# Patient Record
Sex: FEMALE | Race: WHITE | Employment: OTHER | URBAN - METROPOLITAN AREA
[De-identification: names, ages, dates, MRNs, and addresses within clinical notes are randomized per-mention and may not be internally consistent; named-entity substitution may affect disease eponyms.]

---

## 2018-02-05 ENCOUNTER — THERAPY VISIT (OUTPATIENT)
Dept: PHYSICAL THERAPY | Facility: CLINIC | Age: 63
End: 2018-02-05
Payer: COMMERCIAL

## 2018-02-05 DIAGNOSIS — M75.41 IMPINGEMENT SYNDROME OF SHOULDER REGION, RIGHT: Primary | ICD-10-CM

## 2018-02-05 PROCEDURE — 97112 NEUROMUSCULAR REEDUCATION: CPT | Mod: GP | Performed by: PHYSICAL THERAPIST

## 2018-02-05 PROCEDURE — 97110 THERAPEUTIC EXERCISES: CPT | Mod: GP | Performed by: PHYSICAL THERAPIST

## 2018-02-05 PROCEDURE — 97161 PT EVAL LOW COMPLEX 20 MIN: CPT | Mod: GP | Performed by: PHYSICAL THERAPIST

## 2018-02-05 NOTE — MR AVS SNAPSHOT
After Visit Summary   2/5/2018    Radha Schneider    MRN: [id number]           Patient Information     Date Of Birth          1955        Visit Information        Provider Department      2/5/2018 7:00 AM Marifer Andres, PT [facility]        Today's Diagnoses     Impingement syndrome of shoulder region, right    -  1       Follow-ups after your visit        Your next 10 appointments already scheduled     Feb 08, 2018  7:00 AM CST   JAVAD Extremity with Marifer Andres, PT   [facility] ([facility])    [address] [place] [address] [address]  [facility] [address]   [phone]            Feb 14, 2018  7:00 AM CST   JAVAD Extremity with Kaylen Jameson PTA   [facility] ([facility])    [address] [place] [address]   [phone]              Who to contact     If you have questions or need follow up information about today's clinic visit or your schedule please contact [facility] PHYSICAL THERAPY directly at [phone].  Normal or non-critical lab and imaging results will be communicated to you by MyChart, letter or phone within 4 business days after the clinic has received the results. If you do not hear from us within 7 days, please contact the clinic through MyChart or phone. If you have a critical or abnormal lab result, we will notify you by phone as soon as possible.  Submit refill requests through YouTern or call your pharmacy and they will forward the refill request to us. Please allow 3 business days for your refill to be completed.          Additional Information About Your Visit        [employer] Information     YouTern lets you send messages to your doctor, view your test results, renew your prescriptions, schedule appointments and more. To  "sign up, go to [email or web address] . Click on \"Log in\" on the left side of the screen, which will take you to the Welcome page. Then click on \"Sign up Now\" on the right side of the page.     You will be asked to enter the access code listed below, as well as some personal information. Please follow the directions to create your username and password.     Your access code is: [email or web address]  Expires: 2018 10:03 AM     Your access code will  in 90 days. If you need help or a new code, please call your [place] clinic or [phone].        Care EveryWhere ID     This is your Care EveryWhere ID. This could be used by other organizations to access your [place] medical records  [id number]         Blood Pressure from Last 3 Encounters:   No data found for BP    Weight from Last 3 Encounters:   No data found for Wt              We Performed the Following     JAVAD Inital Eval Report     Neuromuscular Re-Education     PT Eval, Low Complexity (57166)     Therapeutic Exercises        Primary Care Provider Office Phone # Fax #    Ramiro Cuevas [phone]        [facility] [address] [place] [address]        Equal Access to Services     EMILIA TANG : Hadii bernardo mcginniso Soalexia, waaxda luqadaha, qaybta kaalmada adeegyada, dung mehta. So [facility] [phone].    ATENCIÓN: Si habla español, tiene a murcia disposición servicios gratuitos de asistencia lingüística. Llame al [phone].    We comply with applicable federal civil rights laws and [place] laws. We do not discriminate on the basis of race, color, national origin, age, disability, sex, sexual orientation, or gender identity.            Thank you!     Thank you for choosing [facility] PHYSICAL THERAPY  for your care. Our goal is always to provide you with excellent care. Hearing back from our patients is one way we can continue to improve our services. " Please take a few minutes to complete the written survey that you may receive in the mail after your visit with us. Thank you!             Your Updated Medication List - Protect others around you: Learn how to safely use, store and throw away your medicines at www.disposemymeds.org.      Notice  As of 2/5/2018 10:03 AM    You have not been prescribed any medications.

## 2018-02-05 NOTE — LETTER
[facility] PHYSICAL THERAPY  [address]  [facility] [address]  [phone]    2018    Re: Radha Schneider   :   1955  MRN:  [id number]   REFERRING PHYSICIAN:   Ramiro Cuevas    [facility] PHYSICAL THERAPY    Date of Initial Evaluation:  2018  Visits:  Rxs Used: 1  Reason for Referral:  Impingement syndrome of shoulder region, right    EVALUATION SUMMARY    Subjective:  Patient is a 62 year old female presenting with rehab right shoulder hpi.   Radha Schneider is a 62 year old female with a right shoulder condition.  Condition occurred with:  Unknown cause.  Condition occurred: for unknown reasons.  This is a new condition  On or about 12/15/2017, I started to have right shoulder pain.  No specific injury or accident.  When I take the IB I feel better.  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    Patient reports pain:  Anterior and medial.  Radiates to:  Shoulder and upper arm.  Pain is described as aching (stiffness) and is intermittent and reported as 5/10.   Pain is worse in the A.M..  Symptoms are exacerbated by using arm at shoulder level and lying on extremity (computer) and relieved by NSAID's and rest.  Since onset symptoms are unchanged.    Previous treatment includes physical therapy (for TKA).    General health as reported by patient is good.  Pertinent medical history includes:  High blood pressure, osteoarthritis, overweight, diabetes, implanted device and smoking.  Medical allergies: no.  Other surgeries include:  Orthopedic surgery (right TKA).  Current medications:  Anti-inflammatory and high blood pressure medication (metformin).  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  Primary job tasks include:  Prolonged sitting (computer).  Barriers: house,  multilevel.  Red flags:  None as reported by the patient.                Objective:  Standing Alignment:    Cervical/Thoracic:  Forward head and thoracic kyphosis increased  Shoulder/UE:  Rounded shoulders, scapular abduction R, scapular abduction L and scapular winging R  Flexibility/Screens:   Positive screens:  ShoulderNegative screens: Cervical   Upper Extremity:    Decreased left upper extremity flexibility at:  Pectoralis Major; Pectoralis Minor and Latissimus  Decreased right upper extremity flexibility present at:  Pectoralis Major; Pectoralis Minor and Latissimus    Re: Radha Schneider   :   1955    Spine:  Decreased right spine flexibility:  Scalenes; Upper Trap and Levator    Shoulder Evaluation:  ROM:  AROM:    Flexion:  Left:  WFL    Right:  WFL with pain  Abduction:  Left: WFL   Right:  90% with pain   Internal Rotation:  Right:  70  External Rotation:  Right:  75  Flexion/External Rotation:  Left:  Neck    Right:  Neck  Extension/Internal Rotation:  Left:  Lower thoracic    Right:  Sacrum with pain     Pain: opposite shoulder left WFL, right touch with pain, CROM right rotation, SB increase in right upper back tightness   Strength:    Flexion: Left:5/5   Pain:    Right: 4+/5      Pain:  ++  Abduction:  Left: 5/5  Pain:    Right: 4+/5      Pain:++  Adduction:  Left: 5/5    Pain:    Right: 5-/5      Pain:-/+  Internal Rotation:  Left:5/5     Pain:    Right: 5/5      Pain:-/+  External Rotation:   Left:5/5     Pain:   Right:5/5     Pain:    Horizontal Adduction:  Right:/5     Pain:-/+  Elbow Flexion:  Left:5/5     Pain:    Right:5/5     Pain:  Elbow Extension:  Left:5/5     Pain:    Right:5/5     Pain:  Stability Testing:    Left shoulder stability negative testing:  Sulcus sign  Right shoulder stability negative testing:  Sulcus sign  Special Tests:    Left shoulder negative for the following special tests:  Impingement  Right shoulder positive for the following special  tests:Impingement  Palpation:    Right shoulder tenderness present at: Acrimioclavicular and Supraspinatus  Mobility Tests:    Glenohumeral posterior right:  Hypomobile  Glenohumeral inferior right:  Hypomobile    Acromioclavicular right:  Hypomobile      Assessment/Plan:    Patient is a 62 year old female with right side shoulder complaints.    Patient has the following significant findings with corresponding treatment plan.                Diagnosis 1:  Right shoulder impingement  Pain -  manual therapy, self management, education and home program  Decreased ROM/flexibility - manual therapy, therapeutic exercise, therapeutic activity and home program  Decreased joint mobility - manual therapy, therapeutic exercise, therapeutic activity and home program  Decreased strength - therapeutic exercise, therapeutic activities and home program  Impaired muscle performance - neuro re-education and home program  Decreased function - therapeutic activities and home program  Impaired posture - neuro re-education, therapeutic activities and home program      Re: Radha Schneider   :   1955    Therapy Evaluation Codes:   1) History comprised of:   Personal factors that impact the plan of care:      Profession.    Comorbidity factors that impact the plan of care are:      Diabetes, High blood pressure, Implanted device, Osteoarthritis and Overweight.     Medications impacting care: Anti-depressant, High blood pressure and diabetic medication.  2) Examination of Body Systems comprised of:   Body structures and functions that impact the plan of care:      Shoulder and Thoracic Spine.   Activity limitations that impact the plan of care are:      Cooking, Dressing, Grasping, Lifting, Reading/Computer work, Working, Sleeping and reaching.  3) Clinical presentation characteristics are:   Stable/Uncomplicated.  4) Decision-Making    Low complexity using standardized patient assessment instrument and/or measureable assessment of  functional outcome.  Cumulative Therapy Evaluation is: Low complexity.    Previous and current functional limitations:  (See Goal Flow Sheet for this information)    Short term and Long term goals: (See Goal Flow Sheet for this information)     Communication ability:  Patient appears to be able to clearly communicate and understand verbal and written communication and follow directions correctly.  Treatment Explanation - The following has been discussed with the patient:   RX ordered/plan of care  Possible risks and side effects  This patient would benefit from PT intervention to resume normal activities.   Rehab potential is good.    Frequency:  2 X week, once daily  Duration:  for 1 weeks tapering to 1 X a week over 4 weeks  Discharge Plan:  Achieve all LTG.  Independent in home treatment program.    Thank you for your referral.    INQUIRIES  Therapist: Marifer Andres, PT  [facility]  [address] [address]  Phone: [phone]  Fax: [phone]

## 2018-02-05 NOTE — PROGRESS NOTES
[facility] Initial Evaluation  Subjective:  Patient is a 62 year old female presenting with rehab right shoulder hpi.   Radha Schneider is a 62 year old female with a right shoulder condition.  Condition occurred with:  Unknown cause.  Condition occurred: for unknown reasons.  This is a new condition  On or about 12/15/2017, I started to have right shoulder pain.  No specific injury or accident.  When I take the IB I feel better.  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    Patient reports pain:  Anterior and medial.  Radiates to:  Shoulder and upper arm.  Pain is described as aching (stiffness) and is intermittent and reported as 5/10.   Pain is worse in the A.M..  Symptoms are exacerbated by using arm at shoulder level and lying on extremity (computer) and relieved by NSAID's and rest.  Since onset symptoms are unchanged.    Previous treatment includes physical therapy (for TKA).    General health as reported by patient is good.  Pertinent medical history includes:  High blood pressure, osteoarthritis, overweight, diabetes, implanted device and smoking.  Medical allergies: no.  Other surgeries include:  Orthopedic surgery (right TKA).  Current medications:  Anti-inflammatory and high blood pressure medication (metformin).  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  Primary job tasks include:  Prolonged sitting (computer).    Barriers: house, multilevel.    Red flags:  None as reported by the patient.                        Objective:  Standing Alignment:    Cervical/Thoracic:  Forward head and thoracic kyphosis increased  Shoulder/UE:  Rounded shoulders, scapular abduction R, scapular abduction L and scapular winging R                  Flexibility/Screens:   Positive screens:  ShoulderNegative screens: Cervical   Upper Extremity:    Decreased left upper extremity  flexibility at:  Pectoralis Major; Pectoralis Minor and Latissimus    Decreased right upper extremity flexibility present at:  Pectoralis Major; Pectoralis Minor and Latissimus    Spine:      Decreased right spine flexibility:  Scalenes; Upper Trap and Levator                       Shoulder Evaluation:  ROM:  AROM:    Flexion:  Left:  WFL    Right:  WFL with pain    Abduction:  Left: WFL   Right:  90% with pain     Internal Rotation:  Right:  70  External Rotation:  Right:  75          Flexion/External Rotation:  Left:  Neck    Right:  Neck  Extension/Internal Rotation:  Left:  Lower thoracic    Right:  Sacrum with pain       Pain: opposite shoulder left WFL, right touch with pain, CROM right rotation, SB increase in right upper back tightness     Strength:    Flexion: Left:5/5   Pain:    Right: 4+/5      Pain:  ++    Abduction:  Left: 5/5  Pain:    Right: 4+/5      Pain:++  Adduction:  Left: 5/5    Pain:    Right: 5-/5      Pain:-/+  Internal Rotation:  Left:5/5     Pain:    Right: 5/5      Pain:-/+  External Rotation:   Left:5/5     Pain:   Right:5/5     Pain:      Horizontal Adduction:  Right:/5     Pain:-/+  Elbow Flexion:  Left:5/5     Pain:    Right:5/5     Pain:  Elbow Extension:  Left:5/5     Pain:    Right:5/5     Pain:  Stability Testing:      Left shoulder stability negative testing:  Sulcus sign    Right shoulder stability negative testing:  Sulcus sign  Special Tests:      Left shoulder negative for the following special tests:  Impingement  Right shoulder positive for the following special tests:Impingement  Palpation:      Right shoulder tenderness present at: Acrimioclavicular and Supraspinatus  Mobility Tests:      Glenohumeral posterior right:  Hypomobile  Glenohumeral inferior right:  Hypomobile    Acromioclavicular right:  Hypomobile                                           General     ROS    Assessment/Plan:    Patient is a 62 year old female with right side shoulder complaints.    Patient has  the following significant findings with corresponding treatment plan.                Diagnosis 1:  Right shoulder impingement  Pain -  manual therapy, self management, education and home program  Decreased ROM/flexibility - manual therapy, therapeutic exercise, therapeutic activity and home program  Decreased joint mobility - manual therapy, therapeutic exercise, therapeutic activity and home program  Decreased strength - therapeutic exercise, therapeutic activities and home program  Impaired muscle performance - neuro re-education and home program  Decreased function - therapeutic activities and home program  Impaired posture - neuro re-education, therapeutic activities and home program    Therapy Evaluation Codes:   1) History comprised of:   Personal factors that impact the plan of care:      Profession.    Comorbidity factors that impact the plan of care are:      Diabetes, High blood pressure, Implanted device, Osteoarthritis and Overweight.     Medications impacting care: Anti-depressant, High blood pressure and diabetic medication.  2) Examination of Body Systems comprised of:   Body structures and functions that impact the plan of care:      Shoulder and Thoracic Spine.   Activity limitations that impact the plan of care are:      Cooking, Dressing, Grasping, Lifting, Reading/Computer work, Working, Sleeping and reaching.  3) Clinical presentation characteristics are:   Stable/Uncomplicated.  4) Decision-Making    Low complexity using standardized patient assessment instrument and/or measureable assessment of functional outcome.  Cumulative Therapy Evaluation is: Low complexity.    Previous and current functional limitations:  (See Goal Flow Sheet for this information)    Short term and Long term goals: (See Goal Flow Sheet for this information)     Communication ability:  Patient appears to be able to clearly communicate and understand verbal and written communication and follow directions  correctly.  Treatment Explanation - The following has been discussed with the patient:   RX ordered/plan of care  Possible risks and side effects  This patient would benefit from PT intervention to resume normal activities.   Rehab potential is good.    Frequency:  2 X week, once daily  Duration:  for 1 weeks tapering to 1 X a week over 4 weeks  Discharge Plan:  Achieve all LTG.  Independent in home treatment program.    Please refer to the daily flowsheet for treatment today, total treatment time and time spent performing 1:1 timed codes.

## 2018-02-08 ENCOUNTER — THERAPY VISIT (OUTPATIENT)
Dept: PHYSICAL THERAPY | Facility: CLINIC | Age: 63
End: 2018-02-08
Payer: COMMERCIAL

## 2018-02-08 DIAGNOSIS — M75.41 IMPINGEMENT SYNDROME OF SHOULDER REGION, RIGHT: ICD-10-CM

## 2018-02-08 PROCEDURE — 97110 THERAPEUTIC EXERCISES: CPT | Mod: GP | Performed by: PHYSICAL THERAPIST

## 2018-02-08 PROCEDURE — 97140 MANUAL THERAPY 1/> REGIONS: CPT | Mod: GP | Performed by: PHYSICAL THERAPIST

## 2018-02-08 PROCEDURE — 97112 NEUROMUSCULAR REEDUCATION: CPT | Mod: GP | Performed by: PHYSICAL THERAPIST

## 2018-02-14 ENCOUNTER — THERAPY VISIT (OUTPATIENT)
Dept: PHYSICAL THERAPY | Facility: CLINIC | Age: 63
End: 2018-02-14
Payer: COMMERCIAL

## 2018-02-14 DIAGNOSIS — M75.41 IMPINGEMENT SYNDROME OF SHOULDER REGION, RIGHT: ICD-10-CM

## 2018-02-14 PROCEDURE — 97110 THERAPEUTIC EXERCISES: CPT | Mod: GP | Performed by: PHYSICAL THERAPY ASSISTANT

## 2018-02-14 PROCEDURE — 97112 NEUROMUSCULAR REEDUCATION: CPT | Mod: GP | Performed by: PHYSICAL THERAPY ASSISTANT

## 2018-02-21 ENCOUNTER — THERAPY VISIT (OUTPATIENT)
Dept: PHYSICAL THERAPY | Facility: CLINIC | Age: 63
End: 2018-02-21
Payer: COMMERCIAL

## 2018-02-21 DIAGNOSIS — M75.41 IMPINGEMENT SYNDROME OF SHOULDER REGION, RIGHT: ICD-10-CM

## 2018-02-21 PROCEDURE — 97110 THERAPEUTIC EXERCISES: CPT | Mod: GP | Performed by: PHYSICAL THERAPY ASSISTANT

## 2018-02-21 PROCEDURE — 97140 MANUAL THERAPY 1/> REGIONS: CPT | Mod: GP | Performed by: PHYSICAL THERAPY ASSISTANT

## 2018-02-21 NOTE — MR AVS SNAPSHOT
"              After Visit Summary   2/21/2018    Radha Schneider    MRN: [id number]           Patient Information     Date Of Birth          1955        Visit Information        Provider Department      2/21/2018 7:00 AM Kaylen Jameson PTA [facility]        Today's Diagnoses     Impingement syndrome of shoulder region, right           Follow-ups after your visit        Your next 10 appointments already scheduled     Mar 07, 2018  7:00 AM CST   JAVAD Extremity with Kaylen Jameson PTA   [facility] ([facility] [place])    [address]  [facility] [address] [phone]              Who to contact     If you have questions or need follow up information about today's clinic visit or your schedule please contact [facility] ATHLETIC [facility] PHYSICAL [facility] directly at [phone].  Normal or non-critical lab and imaging results will be communicated to you by MyChart, letter or phone within 4 business days after the clinic has received the results. If you do not hear from us within 7 days, please contact the clinic through My Digital Shieldhart or phone. If you have a critical or abnormal lab result, we will notify you by phone as soon as possible.  Submit refill requests through sailsquare or call your pharmacy and they will forward the refill request to us. Please allow 3 business days for your refill to be completed.          Additional Information About Your Visit        My Digital [employer] Information     sailsquare lets you send messages to your doctor, view your test results, renew your prescriptions, schedule appointments and more. To sign up, go to [email or web address] . Click on \"Log in\" on the left side of the screen, which will take you to the Welcome page. Then click on \"Sign up Now\" on the right side of the page.     You will be asked to enter the access code listed " below, as well as some personal information. Please follow the directions to create your username and password.     Your access code is: [email or web address]  Expires: 2018 10:03 AM     Your access code will  in 90 days. If you need help or a new code, please call your [place] clinic or [phone].        Care EveryWhere ID     This is your Care EveryWhere ID. This could be used by other organizations to access your [place] medical records  [id number]         Blood Pressure from Last 3 Encounters:   No data found for BP    Weight from Last 3 Encounters:   No data found for Wt              We Performed the Following     MANUAL THER TECH,1+REGIONS,EA 15 MIN     THERAPEUTIC EXERCISES        Primary Care Provider Office Phone # Fax #    Ramiro Cuevas [phone]        [facility] [address] [place] [address]        Equal Access to Services     [facility]: Hadii aad ku hadasho Soomaali, waaxda luqadaha, qaybta kaalmada adeegyada, dung durbin haypablo christie . So [facility] [phone].    ATENCIÓN: Si habla español, tiene a murcia disposición servicios gratuitos de asistencia lingüística. Llame al [phone].    We comply with applicable federal civil rights laws and [place] laws. We do not discriminate on the basis of race, color, national origin, age, disability, sex, sexual orientation, or gender identity.            Thank you!     Thank you for choosing [facility] PHYSICAL THERAPY  for your care. Our goal is always to provide you with excellent care. Hearing back from our patients is one way we can continue to improve our services. Please take a few minutes to complete the written survey that you may receive in the mail after your visit with us. Thank you!             Your Updated Medication List - Protect others around you: Learn how to safely use, store and throw away your medicines at www.disposemymeds.org.      Notice  As of  2/21/2018  8:42 AM    You have not been prescribed any medications.

## 2020-12-02 PROBLEM — M75.41 IMPINGEMENT SYNDROME OF SHOULDER REGION, RIGHT: Status: RESOLVED | Noted: 2018-02-05 | Resolved: 2020-12-02

## 2020-12-02 NOTE — PROGRESS NOTES
Discharge Note    Progress reporting period is from initial evaluation date (please see noted date below) to Feb 21, 2018.  Linked Episodes   Type: Episode: Status: Noted: Resolved: Last update: Updated by:   PHYSICAL THERAPY right shoulder 1/2018 Active 2/5/2018 2/21/2018  7:01 AM Marifer Andres, PT      Comments:       Radha failed to follow up and current status is unknown.  Please see information below for last relevant information on current status.  Patient seen for 4 visits.    SUBJECTIVE  Subjective changes noted by patient:  Pt notes improvement with ADL's such as blow drying hair. Has been sleeping on new pillow which she also finds helpful.   .  Current pain level is 3/10.     Previous pain level was   .   Changes in function:  Yes (See Goal flowsheet attached for changes in current functional level)  Adverse reaction to treatment or activity: None    OBJECTIVE  Changes noted in objective findings: R shoulder AROM WFL and PL up to 5/10 at worst. Improved flexibility with pec region. Cues to avoid fwd head posture, pt able to correct with prompting.      ASSESSMENT/PLAN  Diagnosis: right shoulder pain/impingement   Updated problem list and treatment plan:   Pain - HEP  Decreased function - HEP  Impaired muscle performance - HEP  Impaired posture - HEP  STG/LTGs have been met or progress has been made towards goals:  Yes, please see goal flowsheet for most current information  Assessment of Progress: current status is unknown.    Last current status: Pt is progressing as expected   Self Management Plans:  HEP  I have re-evaluated this patient and find that the nature, scope, duration and intensity of the therapy is appropriate for the medical condition of the patient.  Radha continues to require the following intervention to meet STG and LTG's:  HEP.    Recommendations:  Discharge with current home program.  Patient to follow up with MD as needed.    Please refer to the daily flowsheet for treatment  today, total treatment time and time spent performing 1:1 timed codes.